# Patient Record
Sex: MALE | Race: WHITE | NOT HISPANIC OR LATINO | URBAN - METROPOLITAN AREA
[De-identification: names, ages, dates, MRNs, and addresses within clinical notes are randomized per-mention and may not be internally consistent; named-entity substitution may affect disease eponyms.]

---

## 2019-03-20 ENCOUNTER — OFFICE VISIT - RIVER FALLS (OUTPATIENT)
Dept: FAMILY MEDICINE | Facility: CLINIC | Age: 59
End: 2019-03-20

## 2019-03-20 ASSESSMENT — MIFFLIN-ST. JEOR: SCORE: 1546.75

## 2019-03-21 LAB
CHOLEST SERPL-MCNC: 243 MG/DL
CHOLEST/HDLC SERPL: 3.3 {RATIO}
GLUCOSE BLD-MCNC: 92 MG/DL (ref 65–99)
HDLC SERPL-MCNC: 73 MG/DL
LDLC SERPL CALC-MCNC: 153 MG/DL
NONHDLC SERPL-MCNC: 170 MG/DL
PSA SERPL-MCNC: 1.7 NG/ML
TRIGL SERPL-MCNC: 71 MG/DL

## 2022-02-11 VITALS
WEIGHT: 165 LBS | SYSTOLIC BLOOD PRESSURE: 136 MMHG | BODY MASS INDEX: 23.62 KG/M2 | HEIGHT: 70 IN | TEMPERATURE: 97.1 F | DIASTOLIC BLOOD PRESSURE: 81 MMHG | HEART RATE: 66 BPM

## 2022-02-16 NOTE — NURSING NOTE
Hearing and Vision Screening Entered On:  3/20/2019 2:03 PM CDT    Performed On:  3/20/2019 2:03 PM CDT by Tessa Hernandez CMA               Hearing and Vision Screening   Audiogram Result Right Ear :   Fail   Audiogram Result Left Ear :   Fail   Hearing Screen Comments :   Pt declines testing at this time.    Tessa Hernandez CMA - 3/20/2019 2:03 PM CDT

## 2022-02-16 NOTE — LETTER
(Inserted Image. Unable to display)   March 21, 2019      AI WILLARD      [id number], [place] [id number]        Dear AI,    Thank you for selecting [facility] for your healthcare needs.  Below you will find the results of the recent tests done at our clinic.     Work on low cholesterol diet and recheck in 1 year.      Result Name Current Result Reference Range   Glucose Level (mg/dL)  92 3/20/2019 65 - 99   Cholesterol (mg/dL) ((H)) 243 3/20/2019  - <200   HDL (mg/dL)  73 3/20/2019 >40 -    LDL ((H)) 153 3/20/2019    Triglyceride (mg/dL)  71 3/20/2019  - <150   PSA (ng/mL)  1.7 3/20/2019  - < OR = 4.0       Please contact me or my assistant at [phone] if you have any questions.     Sincerely,        Fabrizio Vvieros MD        What do your labs mean?  Below is a glossary of commonly ordered labs:  LDL   Bad Cholesterol   HDL   Good Cholesterol  AST/ALT   Liver Function   Cr/Creatinine   Kidney Function  Microalbumin   Kidney Function  BUN   Kidney Function  PSA   Prostate    TSH   Thyroid Hormone  HgbA1c   Diabetes Test   Hgb (Hemoglobin)   Red Blood Cells

## 2022-02-16 NOTE — TELEPHONE ENCOUNTER
Order is sent to [facility] and they will contact patient.  Patient informed.

## 2022-02-16 NOTE — LETTER
(Inserted Image. Unable to display)   April 03, 2019      AI LAMAR  [id number], [place] [phone]        Dear AI,      Thank you for selecting [facility] for your healthcare needs.      Your recent CT Scan was normal.           Please contact me or my assistant at [phone] if you have any questions or concerns.     Sincerely,        Fabrizio Viveros MD

## 2022-02-16 NOTE — PROGRESS NOTES
Patient:   AI LAMAR            MRN: [id number]            FIN: [id number]               Age:   58 years     Sex:  Male     :  1960   Associated Diagnoses:   Well adult exam; Abdominal pain in male   Author:   Fabrizio Viveros MD      Visit Information      Date of Service: 2019 01:58 pm  Performing Location: [facility]  Encounter#: [id number]      Primary Care Provider (PCP):  Sukumar Becker MD    NPI# [id number]      Referring Provider:  Fabrizio Viveros MD    NPI# [id number]      Chief Complaint   3/20/2019 1:51 PM CDT    Annual exam. Pt is fasting.     Routine Health Care Visit      History of Present Illness   Doing well no concerns              The patient presents for a general exam.  The patient's general health status is described as good.  The patient's diet is described as balanced.  Exercise: routine.  Associated symptoms consist of none.  Additional pertinent history: occasional caffeine use and tobacco use none.     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    Eye:  Negative.    Ear/Nose/Mouth/Throat:  Negative.    Respiratory:  Negative.    Cardiovascular:  Negative.    Gastrointestinal:  Negative except as documented in history of present illness.    Genitourinary:  Negative.    Hematology/Lymphatics:  Negative.    Endocrine:  Negative.    Immunologic:  Negative.    Musculoskeletal:  Negative.    Integumentary:  Negative.    Neurologic:  Negative.    Psychiatric:  Negative.    All other systems reviewed and negative      Health Status   Allergies:    Allergic Reactions (Selected)  No known allergies   Medications:  (Selected)   ,    Medications          No Known Home Medications   Problem list:    All Problems  Resolved: Dislocated Shoulder / ICD-9-.00      Histories   Past Medical History:    Resolved  Dislocated Shoulder (831.00): Onset on 10/14/1996 at 36 years.   Resolved.   Family History:    Mother  Hypertension  Breast cancer  Father  Hypertension  Parkinson disease  Grandfather (P)  CA - Cancer of colon     Procedure history:    Colonoscopy (SNOMED CT 587816477) performed by Sj Ivey MD on 2/2/2010 at 49 Years.  Bilateral vasectomy (SNOMED CT 191267381) on 8/18/1992 at 32 Years.  bilateral laparoscopic inguinal hernia repair.   Social History:        Alcohol Assessment: Current            Current, 3-5 times per week, 2 drinks/episode average.      Tobacco Assessment: Denies Tobacco Use            Never      Substance Abuse Assessment: Denies Substance Abuse            Never      Employment and Education Assessment            Employed, Work/School description: farmer.      Home and Environment Assessment            Marital status: .  Lives with Children.      Physical Examination   Vital Signs   3/20/2019 1:51 PM CDT Temperature Tympanic 97.1 DegF  LOW    Peripheral Pulse Rate 66 bpm    Pulse Site Radial artery    HR Method Electronic    Systolic Blood Pressure 136 mmHg  HI    Diastolic Blood Pressure 81 mmHg  HI    Mean Arterial Pressure 99 mmHg    BP Site Right arm    BP Method Electronic      Measurements from flowsheet : Measurements   3/20/2019 1:51 PM CDT Height Measured - Standard 69.5 in    Weight Measured - Standard 165 lb    BSA 1.91 m2    Body Mass Index 24.01 kg/m2      General:  Alert and oriented.    Eye:  Pupils are equal, round and reactive to light, Normal conjunctiva.    HENT:  Normocephalic, Tympanic membranes are clear, Oral mucosa is moist.    Neck:  Supple, Non-tender, No lymphadenopathy, No thyromegaly.    Respiratory:  Breath sounds are equal, Symmetrical chest wall expansion.         Respirations: Are within normal limits.         Pattern: Regular.         Breath sounds: Bilateral, Within normal limits.    Cardiovascular:  Normal rate, Regular rhythm, No murmur, Good pulses equal in all extremities, Normal peripheral perfusion, No  edema.    Gastrointestinal:  Soft, Non-tender, Non-distended, Normal bowel sounds.    Musculoskeletal:  No tenderness, No swelling.    Integumentary:  Warm, Dry.    Neurologic:  No focal deficits.    Cognition and Speech:  Oriented, Speech clear and coherent.    Psychiatric:  Appropriate mood & affect, Normal judgment.       Health Maintenance      Recommendations     Pending (in the next year)        OverDue           Lipid Disorders Screen (Male) due  05/31/13  and every 1  year(s)           Alcohol Misuse Screen (Male) due  05/26/16  and every 1  year(s)        Due            Aspirin Therapy for Prevention of CVD (Male) due  03/20/19  and every 5  year(s)           HIV Screen (if sexually active) (Male) due  03/20/19  and every 1  year(s)           Hepatitis C Screen 5710-1675 (Male) due  03/20/19  One-time only           Influenza Vaccine due  03/20/19  and every 1  year(s)           Lung Cancer Screen (Male) due  03/20/19  and every 1  year(s)           STD Counseling (if sexually active) (Male) due  03/20/19  and every 1  year(s)           Syphilis Screen (if sexually active) (Male) due  03/20/19  and every 1  year(s)           Type 2 Diabetes Mellitus Screen (Male) due  03/20/19  Variable frequency        Due In Future            Colorectal Cancer Screen (Colonoscopy) (Male) not due until  02/02/20  and every 10  year(s)           Colorectal Cancer Screen (Occult Blood) (Male) not due until  02/02/20  and every 10  year(s)           Colorectal Cancer Screen (Sigmoidoscopy) (Male) not due until  02/02/20  and every 10  year(s)     Satisfied (in the past 1 year)        Satisfied            Body Mass Index Check (Male) on  03/20/19.           Depression Screen (Male) on  03/20/19.           High Blood Pressure Screen (Male) on  03/20/19.           Tetanus Vaccine on  03/20/19.           Tobacco Use Screen (Male) on  03/20/19.      Impression and Plan   Diagnosis     Well adult exam (WGU33-GD Z00.00).      Abdominal pain in male (KLH30-WL R10.9).     Course:  Not progressing as expected.    Plan:  hcm reviewed, Ct abd  consider surgical consult given hernia repair  labs/psa  htn reviewed.    Patient Instructions:       Counseled: Patient, Diet, Activity, Verbalized understanding.    Counseled:  Patient, Routine exercise and healthy weight maintenance discussed.    Patient Instructions:  Return to clinic in one year for next routine health visit, or sooner if problems or concerns.

## 2022-02-16 NOTE — NURSING NOTE
Comprehensive Intake Entered On:  3/20/2019 2:02 PM CDT    Performed On:  3/20/2019 1:51 PM CDT by Tessa Hernandez CMA               Summary   Chief Complaint :   Annual exam. Pt is fasting.    Weight Measured :   165 lb(Converted to: 165 lb 0 oz, 74.84 kg)    Height Measured :   69.5 in(Converted to: 5 ft 9 in, 176.53 cm)    Body Mass Index :   24.01 kg/m2   Body Surface Area :   1.91 m2   Systolic Blood Pressure :   136 mmHg (HI)    Diastolic Blood Pressure :   81 mmHg (HI)    Mean Arterial Pressure :   99 mmHg   Peripheral Pulse Rate :   66 bpm   BP Site :   Right arm   Pulse Site :   Radial artery   BP Method :   Electronic   HR Method :   Electronic   Temperature Tympanic :   97.1 DegF(Converted to: 36.2 DegC)  (LOW)    Race :      Languages :   English   Ethnicity :   Not  or    Tessa Hernandez CMA - 3/20/2019 1:51 PM CDT   Health Status   Allergies Verified? :   Yes   Medication History Verified? :   Yes   Pre-Visit Planning Status :   Completed   Tobacco Use? :   Never smoker   Tessa Hernandez CMA - 3/20/2019 1:51 PM CDT   Consents   Consent for Immunization Exchange :   Consent Granted   Consent for Immunizations to Providers :   Consent Granted   Tessa Hernandez CMA - 3/20/2019 1:51 PM CDT   Meds / Allergies   (As Of: 3/20/2019 2:02:38 PM CDT)   Allergies (Active)   No known allergies  Estimated Onset Date:   Unspecified ; Created By:   Radha Ding; Reaction Status:   Active ; Category:   Drug ; Substance:   No known allergies ; Type:   Allergy ; Updated By:   Radha Ding; Source:   Patient ; Reviewed Date:   5/26/2015 12:17 PM CDT        Medication List   (As Of: 3/20/2019 2:02:38 PM CDT)   No Known Home Medications     Tessa Hernandez CMA - 3/20/2019 2:01:20 PM

## 2022-02-28 ENCOUNTER — TRANSFERRED RECORDS (OUTPATIENT)
Dept: HEALTH INFORMATION MANAGEMENT | Facility: CLINIC | Age: 62
End: 2022-02-28

## 2024-08-14 NOTE — NURSING NOTE
Depression Screening Entered On:  3/20/2019 2:49 PM CDT    Performed On:  3/20/2019 2:48 PM CDT by Tessa Hernandez CMA               Depression Screening   Depression Screening Comment :   Did not complete form   Tessa Hernandez CMA - 3/20/2019 2:48 PM CDT   alone